# Patient Record
Sex: FEMALE | Race: WHITE | NOT HISPANIC OR LATINO | URBAN - METROPOLITAN AREA
[De-identification: names, ages, dates, MRNs, and addresses within clinical notes are randomized per-mention and may not be internally consistent; named-entity substitution may affect disease eponyms.]

---

## 2023-02-28 PROBLEM — M25.559 HIP JOINT PAIN: Status: ACTIVE | Noted: 2023-02-28

## 2023-02-28 PROBLEM — G57.91 NEURITIS OF RIGHT FOOT: Status: ACTIVE | Noted: 2023-02-28

## 2023-02-28 PROBLEM — S39.012A LUMBAR STRAIN, INITIAL ENCOUNTER: Status: ACTIVE | Noted: 2023-02-28

## 2023-02-28 PROBLEM — M54.50 LOW BACK PAIN: Status: ACTIVE | Noted: 2023-02-28

## 2023-02-28 PROBLEM — R56.9 SEIZURES (MULTI): Status: ACTIVE | Noted: 2023-02-28

## 2023-02-28 PROBLEM — M77.41 METATARSALGIA OF BOTH FEET: Status: ACTIVE | Noted: 2023-02-28

## 2023-02-28 PROBLEM — M25.519 SHOULDER PAIN: Status: ACTIVE | Noted: 2023-02-28

## 2023-02-28 PROBLEM — M77.42 METATARSALGIA OF BOTH FEET: Status: ACTIVE | Noted: 2023-02-28

## 2023-02-28 PROBLEM — G57.92 NEURITIS OF FOOT, LEFT: Status: ACTIVE | Noted: 2023-02-28

## 2023-02-28 PROBLEM — M79.671 PAIN IN BOTH FEET: Status: ACTIVE | Noted: 2023-02-28

## 2023-02-28 PROBLEM — M20.22 HALLUX RIGIDUS OF LEFT FOOT: Status: ACTIVE | Noted: 2023-02-28

## 2023-02-28 PROBLEM — M79.672 PAIN IN BOTH FEET: Status: ACTIVE | Noted: 2023-02-28

## 2023-02-28 RX ORDER — DEXTROAMPHETAMINE SACCHARATE, AMPHETAMINE ASPARTATE, DEXTROAMPHETAMINE SULFATE AND AMPHETAMINE SULFATE 3.75; 3.75; 3.75; 3.75 MG/1; MG/1; MG/1; MG/1
TABLET ORAL
COMMUNITY
Start: 2015-02-13

## 2023-02-28 RX ORDER — SERTRALINE HYDROCHLORIDE 100 MG/1
TABLET, FILM COATED ORAL
COMMUNITY
Start: 2015-02-13

## 2023-02-28 RX ORDER — ZOLPIDEM TARTRATE 10 MG/1
TABLET ORAL
COMMUNITY
Start: 2016-06-09

## 2023-02-28 RX ORDER — FOLIC ACID 1 MG/1
4 TABLET ORAL DAILY
COMMUNITY
Start: 2018-03-02

## 2023-02-28 RX ORDER — DEXTROAMPHETAMINE SACCHARATE, AMPHETAMINE ASPARTATE, DEXTROAMPHETAMINE SULFATE AND AMPHETAMINE SULFATE 1.25; 1.25; 1.25; 1.25 MG/1; MG/1; MG/1; MG/1
TABLET ORAL
COMMUNITY
Start: 2016-06-09

## 2023-02-28 RX ORDER — LEVETIRACETAM 1000 MG/1
1 TABLET ORAL 2 TIMES DAILY
COMMUNITY
Start: 2021-02-01

## 2023-03-07 ENCOUNTER — APPOINTMENT (OUTPATIENT)
Dept: PEDIATRICS | Facility: CLINIC | Age: 43
End: 2023-03-07
Payer: COMMERCIAL

## 2024-08-21 ENCOUNTER — APPOINTMENT (OUTPATIENT)
Dept: NEUROLOGY | Facility: CLINIC | Age: 44
End: 2024-08-21
Payer: COMMERCIAL

## 2024-08-21 VITALS
HEART RATE: 88 BPM | BODY MASS INDEX: 22.2 KG/M2 | HEIGHT: 64 IN | DIASTOLIC BLOOD PRESSURE: 75 MMHG | WEIGHT: 130 LBS | SYSTOLIC BLOOD PRESSURE: 117 MMHG

## 2024-08-21 DIAGNOSIS — R56.9 SEIZURES (MULTI): Primary | ICD-10-CM

## 2024-08-21 RX ORDER — LEVETIRACETAM 1000 MG/1
1000 TABLET ORAL 2 TIMES DAILY
Qty: 60 TABLET | Refills: 11 | Status: SHIPPED | OUTPATIENT
Start: 2024-08-21 | End: 2025-08-21

## 2024-08-21 ASSESSMENT — ENCOUNTER SYMPTOMS
LOSS OF SENSATION IN FEET: 0
OCCASIONAL FEELINGS OF UNSTEADINESS: 0
DEPRESSION: 1

## 2024-08-21 ASSESSMENT — PATIENT HEALTH QUESTIONNAIRE - PHQ9
1. LITTLE INTEREST OR PLEASURE IN DOING THINGS: SEVERAL DAYS
2. FEELING DOWN, DEPRESSED OR HOPELESS: SEVERAL DAYS
SUM OF ALL RESPONSES TO PHQ9 QUESTIONS 1 & 2: 2

## 2024-08-21 NOTE — PROGRESS NOTES
"Adult Epilepsy Clinic History and Physical    History Of Present Illness  Era Gray is a 44 y.o. woman with history of suspected seizures who presents to re-establish care in the epilepsy clinic after being seen last by Dr. Shaw 2/1/21.     Last Clinic Visit:    Discussed that her events are unusual given her events stopped for nearly two years after stopping taking medication at the start of her pregnancy only occurring under very stressful conditions. However, decision was made to continue Keppra 100 BID and follow up in 4-5 months as there is enough suspicion that these are epileptic events with condition of if she continues to have events while on the current dose of medication she will need to admit the patient to the EMU. Instructed not be driving for at least six months after her last seizure which was December 22/23rd, 2020.      Epilepsy Classification:  Epileptic Paroxysmal Episodes  Epileptogenic Zone: Unknown  Epileptic seizure semiology:  1. 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 Last in Jan 2024.             Onset: 2007 though possibly as early as 1999  Etiology:          Unknown                                           Significant Comorbidities: Depression  Epilepsy Risk factors: Head trauma in 2007 with amnesia with viral meningitis   Prior AEDs: Lamotrigine (non-adherence), Phenytoin (made her feel slow)  MRI: Report normal from 4/5/15  Current disease therapies: Keppra 1000 mg BID  Baseline levels/date: Keppra level 28 (on 3/2/18 at 16:21)       Seizure-Event History:   At the age of 19 she had \"fainting spells\" (she was a college student who just started taking Wellbutrin and had episodes where she passed out) and did not seek medical attention even though she had 2-3 episodes. There was no associated tongue bite. No witness were present.      Then at the age of 26 she suffered from viral meningitis as well " "has head trauma with amnesia during the meningitis. She started to feel \"difficult to explain\" like \"uncoordinated thinking and random thoughts\" and then she passes out with the next thing she remembers is waking up confused and generalized soreness which might last for an hour. For 2 weeks after each episode she has trouble remembering basic stuff like phone number, address, her age etc. No tongue bites or incontinence. Witnesses have described them as \"she tenses up with minimal shaking, foaming of mouth\". She was previously on Phenytoin and lamotrigine which she was non compliant.     2015: She had a five day EMU evaluation which showed no evidence of epilepsy although no events were captured, MRI done at that time was normal. She stopped taking AEDs by herself and then saw an general neurologist in April 2017. At that time keppra was started and then increased to 500 mg BID in August 2017. Since August 2017, pt reports that she is compliant with Keppra. She had about 4 seizures-like events similar to one described above. In addition she had 4-5 episodes where had the initial feeling (aura) that she is going to have a seizure but it did not progress.     2018 seen by Dr. Lee, on Keppra 750 mg BID.     2019: Stopped taking her Keppra when she had a pregnancy in July 2019. Gave birth in April 2020. No events 2018 to 2020.   2020 just had her \"Jumbled thinking\", had four episodes of this 5945-8801. On December 22 or 23rd she reports her full body tonic episode. She was restarted on Keppra 1000 mg BID.     She notes that she had increased frequency of her events that seen to happen when she has increased stress in her life.      Seizure description:  She describes that the seizures start with a feeling difficult to explain, resembling an incoherent thinking and inability to get her thoughts straight. This makes her afraid, it lasts only a few seconds. After that she does not remember anything about the seizure " "itself. She wakes up terrified and is confused. It takes her many hours to get back to baseline and this is taking longer with every seizures. She had only one episode of an aura without progressing into a generalized convulsion, and occasionally she would not have the aura with the seizures.  As per the witnesses, she would scream loudly and shake all over with mostly stiffening. There is no description of head version or other asymmetries in the shaking pattern except drooling and a \"facial droop\" as per the mother, although she cannot remember the exact side. The duration is unknown but in the order of minutes?     Previous medications:  She was started on Lamictal though she was taking it in inconsistently, replaced with Phenytoin in 2015, which she also took inconsistently. It was stopped and Keppra was started, then pt stopped taking Keppra when she became pregnant in 2019. Took 5023-6098. Stopped again after her 4th child was born in 2022 until 2024.      Medical history  - History of viral meningitis  - Depression and anxiety     Surgical history  - Tonsillectomy  - History of foot surgery     Social History  - Tobacco: denies use  - Alcohol: Has two glasses of wine a couple times a week  - Drugs: Denies use  - Works as a dance instructor     Family History  - Denies history of epilepsy     Allergies  NKDA     Meds  - Keppra 1000 mg BID  - Zoloft      Present Concerns:   Today she never restarted taking medications in after stopping in 2022 (preg with 4th child) however had a seizure-like event in Jan 2024. She was standing watching her daughter play on the floor, then felt a little tired and next thing she knows she woke up on the ground. She was told by daughter and  that she had fallen and was having full body convulsions for over 5 minutes. No tongue bite, incontinence. Took a day to feel back to normal. Started back on Keppra 1000 BID with no SE or seizure-like events since.     Epilepsy Risk " Factors:   History of head trauma: n  History of Febrile seizures: n  Family history of seizures/epilepsy: n  History of CNS infection: viral meningitis  History of other CNS injury: no  Birth/Developmental history: no    Epilepsy Co-morbidites:  Depression: y  Anxiety: y  Memory loss: n  Sleep disorders: n    History of status epilepticus: no    Social History:   ETOH: no  Smoking: no  Illicit drug use: no  Occupation: dance instructor  Education level: cosmotology  Driving: yes    Past Medical History  Past Medical History:   Diagnosis Date    Personal history of infections of the central nervous system 02/13/2015    History of meningitis     Surgical History  Past Surgical History:   Procedure Laterality Date    FOOT SURGERY  02/13/2015    Foot Surgery    TONSILLECTOMY  02/13/2015    Tonsillectomy     Social History     Allergies  Patient has no known allergies.  (Not in a hospital admission)    Medications  Current Outpatient Medications   Medication Instructions    amphetamine-dextroamphetamine (AdderalL) 15 mg tablet Adderall 15 MG Oral Tablet   Refills: 0        Start : 13-Feb-2015   Active    amphetamine-dextroamphetamine (Adderall) 5 mg tablet Amphetamine-Dextroamphetamine 5 MG Oral Tablet   Quantity: 30  Refills: 0        Start : 9-Jun-2016   Active    folic acid (Folvite) 1 mg tablet 4 tablets, oral, Daily    Lactobacillus acidophilus (ACIDOPHILUS ORAL) Acidophilus Oral Capsule   Refills: 0        Start : 13-Feb-2015   Active    levETIRAcetam (Keppra) 1,000 mg tablet 1 tablet, oral, 2 times daily    sertraline (Zoloft) 100 mg tablet Zoloft 100 MG Oral Tablet   Refills: 0        Start : 13-Feb-2015   Active    zolpidem (Ambien) 10 mg tablet Zolpidem Tartrate 10 MG Oral Tablet   Quantity: 30  Refills: 0        Start : 9-Jun-2016   Active           Review of Systems  Physical Exam  Constitutional: General appearance: no acute distress   Cranial nerves III, IV, and VI: Pupils round, equally reactive to light;  no ptosis. EOMs intact. No nystagmus.   Cranial Nerve V: Facial sensation intact bilaterally.   Cranial nerve VII: Normal and symmetric facial strength.   Cranial nerve VIII: Hearing is intact bilaterally to finger rub / whisper.   Cranial nerves IX and X: Palate elevates symmetrically.   Cranial nerve XI: Shoulder shrug and neck rotation strength are intact.   Cranial nerve XII: Tongue midline with normal strength.   Motor: Muscle bulk was normal in both upper and lower extremities. Muscle tone was normal in both upper and lower extremities. Normal strength in all limbs.   Deep Tendon Reflexes: left biceps 2, right biceps 2, left triceps 2, right triceps 2, left brachioradialis 2, right brachioradialis 2, left patella 2, right patella 2, left ankle jerk 2, right ankle jerk 2.   Sensory Exam: Normal to light touch.   Coordination: There is no limb dystaxia and rapid alternating movements are intact.   Gait: Gait is normal without spasticity, ataxia or bradykinesia. Stance is stable with a negative Romberg.       Last Recorded Vitals  There were no vitals taken for this visit.    Relevant Results/Neurodiagnostics  No MRI head results found for the past 12 months       I have personally reviewed the above imaging and EEG results.     Assessment/Plan  Era Gray is a 44 y.o. woman with history of suspected seizures who presents to re-establish care in the epilepsy clinic after being seen last by Dr. Shaw 2/1/21. Though EMU stay in was uneventful, with no events, she continues to have events concerning for GTC with non-compliance of prescribed Keppra.     Epilepsy Classification:  Epileptic Paroxysmal Episodes  Epileptogenic Zone: Unknown  Epileptic seizure semiology:  1. Type 1: Psychic aura (angel vu) -> GTC            Frequency: As often as every 1-2 months            Onset: 2007 though possibly as early as 1999  Etiology:          Unknown                                           Significant Comorbidities:  Depression  Epilepsy Risk factors: Head trauma in 2007 with amnesia with viral meningitis   Prior AEDs: Lamotrigine (non-adherence), Phenytoin (made her feel slow)  MRI: Report normal from 4/5/15  Current disease therapies: Keppra 1000 mg BID  Baseline levels/date: Keppra level 28 (on 3/2/18 at 16:21)    PLAN:  1) Continue Keppra 1000 BID  2) RTC 6 months.         Gene Henry   Epilepsy Center, [facility]    I saw and evaluated the patient. I personally obtained the key and critical portions of the history and physical exam or was physically present for key and critical portions performed by the resident/fellow. I reviewed the resident/fellow's documentation and discussed the patient with the resident/fellow. I agree with the resident/fellow's medical decision making as documented in the note.    Juana Vital MD

## 2024-08-21 NOTE — LETTER
August 21, 2024     Patient: Era Gray   YOB: 1980   Date of Visit: 8/21/2024       To Whom It May Concern:    Era Gray was seen in my clinic on 8/21/2024 at 9:30 am.     If you have any questions or concerns, please don't hesitate to call.         Sincerely,         Juana Vital MD        CC: No Recipients

## 2024-09-10 ENCOUNTER — APPOINTMENT (OUTPATIENT)
Dept: NEUROLOGY | Facility: CLINIC | Age: 44
End: 2024-09-10
Payer: COMMERCIAL

## 2024-11-05 ENCOUNTER — APPOINTMENT (OUTPATIENT)
Dept: NEUROLOGY | Facility: CLINIC | Age: 44
End: 2024-11-05
Payer: COMMERCIAL

## 2024-11-06 ENCOUNTER — EVALUATION (OUTPATIENT)
Dept: PHYSICAL THERAPY | Facility: CLINIC | Age: 44
End: 2024-11-06
Payer: COMMERCIAL

## 2024-11-06 DIAGNOSIS — S83.242A OTHER TEAR OF MEDIAL MENISCUS, CURRENT INJURY, LEFT KNEE, INITIAL ENCOUNTER: ICD-10-CM

## 2024-11-06 DIAGNOSIS — M25.562 LEFT MEDIAL KNEE PAIN: Primary | ICD-10-CM

## 2024-11-06 DIAGNOSIS — M17.12 UNILATERAL PRIMARY OSTEOARTHRITIS, LEFT KNEE: ICD-10-CM

## 2024-11-06 DIAGNOSIS — S83.105A UNSPECIFIED DISLOCATION OF LEFT KNEE, INITIAL ENCOUNTER: ICD-10-CM

## 2024-11-06 PROCEDURE — 97161 PT EVAL LOW COMPLEX 20 MIN: CPT | Mod: GP | Performed by: PHYSICAL THERAPIST

## 2024-11-06 PROCEDURE — 97110 THERAPEUTIC EXERCISES: CPT | Mod: GP | Performed by: PHYSICAL THERAPIST

## 2024-11-06 ASSESSMENT — ENCOUNTER SYMPTOMS
DEPRESSION: 0
OCCASIONAL FEELINGS OF UNSTEADINESS: 0
LOSS OF SENSATION IN FEET: 0

## 2024-11-06 ASSESSMENT — COLUMBIA-SUICIDE SEVERITY RATING SCALE - C-SSRS
1. IN THE PAST MONTH, HAVE YOU WISHED YOU WERE DEAD OR WISHED YOU COULD GO TO SLEEP AND NOT WAKE UP?: NO
6. HAVE YOU EVER DONE ANYTHING, STARTED TO DO ANYTHING, OR PREPARED TO DO ANYTHING TO END YOUR LIFE?: NO
2. HAVE YOU ACTUALLY HAD ANY THOUGHTS OF KILLING YOURSELF?: NO

## 2024-11-06 ASSESSMENT — PATIENT HEALTH QUESTIONNAIRE - PHQ9
2. FEELING DOWN, DEPRESSED OR HOPELESS: NOT AT ALL
1. LITTLE INTEREST OR PLEASURE IN DOING THINGS: NOT AT ALL
SUM OF ALL RESPONSES TO PHQ9 QUESTIONS 1 AND 2: 0

## 2024-11-06 NOTE — PROGRESS NOTES
PHYSICAL THERAPY   EVALUATION & TREATMENT NOTE    Patient Name:  Era Gray   Patient MRN: [id number]  Date: 11/6/2024    Time Calculation  Start Time: 0308  Stop Time: 0402  Time Calculation (min): 54 min    Insurance:  Insurance Type: [facility]  Visit number: 1  [place]ved # of visits [place]  Authorization Needed: No    General   Reason for visit: L knee medial meniscus tear   Referred by: Dawson Singer    Therapy diagnoses:   Problem List Items Addressed This Visit             ICD-10-CM    Left medial knee pain - Primary M25.562     Other Visit Diagnoses         Codes    Unspecified dislocation of left knee, initial encounter     S83.105A    Other tear of medial meniscus, current injury, left knee, initial encounter     S83.242A    Relevant Orders    Follow Up In Physical Therapy    Unilateral primary osteoarthritis, left knee     M17.12            ASSESSMENT   43 y/o female c/o L knee pain 2/2 medial meniscus tear for the past few years, worsening the last few months. 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 Patient will benefit from skilled therapy to address these impairments and return to prior level of functioning.     PLAN   Goals  1. Pt will be independent in HEP in 6-8 weeks  2. Pt will report 0-1/10 L knee pain at rest and with activity in 6-8 weeks.  3. 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 Pt will demonstrate good form with SL squat in 6-8 weeks.  5. Pt will report LEFS score > 70/80 in 6-8 weeks.    Plan of care to include: therapeutic exercise, therapeutic activity, soft tissue mobilization, joint mobilizations, neuromuscular re-education, pt education, self care activities, home program, vaso/cryotherapy, dry  needling    1x/week for 6-8 weeks.    Patient agrees to plan of care.    SUBJECTIVE   Reviewed medical history form with patient and medical screening assessed     Knee pain started during her last pregnancy with weight gain. Has been getting worse and has had good days and bad days. MCL sprain and medial meniscus. Had a course of oral steroids which helped.     Pain:  Medial L knee joint. Denies catching, but does hear clicking  Exacerbating factors include bending the knee fully, push off in lateral movements, turning out  Relieving factors include resting, ice, NSAIDS    Function:  Pushes through to do her normal ADLS, tries to favor it when teaching dance.  Sleep- unaffected by her knee  Lives with her  and 4 kids.     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     Social:  Exercise- none outside of her work    Pt goals: alleviate pain    Language: English  Potential barriers to treatment: continue to assess    Precautions:    PMH: seizures  Fall risk -  none      OBJECTIVE *=painful  Gait Ambulates IND, non antalgic    Observation/Posture  L pes planus and arch pronation  1st position plie with knees over 1st toe (dynamic valgus)  Squats with significant anterior drive. SL squat dec stability on L LE    Palpation  (+) TTP L medial joint line    Range of Motion (R, L in degrees)  Knee WNL B some pain at end range flexion    Flexibility (R, L)  Hamstrings WNL, WNL  Quads min dec, min dec    Strength (R, L MMT out of 5)  Hip Flexion 5, 5  Hip Extension 4, 4  Hip Abduction 4, 4  Hip Adduction 4, 4-  Knee Flexion 5, 4+*  Knee Extension 5, 5    Outcome Measures  LEFS = 65/80    Today's treatment and initial evaluation included:  - Patient education regarding diagnosis, prognosis, contributing factors, comorbidities, activity modification, symptom monitoring, importance of HEP, role of PT, postural re-education, appropriate shoe wear, work ergonomics, body mechanics, return to sport, reviewed  office cancel/no show policy.  - Review of POC   - Therapeutic Exercise & given HEP handout & blue band::  Access Code: [email or web address]  - Arch Lifting  - 10 x daily - 10 reps - 10 sec hold  - Single Leg Sit to Stand with Arms Extended  - 1 x daily - 2 sets - 10 reps  - Standing Hip Abduction with Resistance at Ankles  - 1 x daily - 2 sets - 15 reps  - Hip Extension with Resistance Loop  - 1 x daily - 2 sets - 15 reps  - Single Leg Bridge  - 1 x daily - 2 sets - 10 reps - 5 hold  - Modified Side Plank with Hip Abduction  - 1 x daily - 2 sets - 20 reps    PT Evaluation Time Entry  PT Evaluation (Low) Time Entry: 30  PT Therapeutic Procedures Time Entry  Therapeutic Exercise Time Entry: 24

## 2024-11-12 ENCOUNTER — TREATMENT (OUTPATIENT)
Dept: PHYSICAL THERAPY | Facility: CLINIC | Age: 44
End: 2024-11-12
Payer: COMMERCIAL

## 2024-11-12 DIAGNOSIS — S83.242A OTHER TEAR OF MEDIAL MENISCUS, CURRENT INJURY, LEFT KNEE, INITIAL ENCOUNTER: ICD-10-CM

## 2024-11-12 DIAGNOSIS — M25.562 LEFT MEDIAL KNEE PAIN: Primary | ICD-10-CM

## 2024-11-12 PROCEDURE — 97140 MANUAL THERAPY 1/> REGIONS: CPT | Mod: GP | Performed by: PHYSICAL THERAPIST

## 2024-11-12 NOTE — PROGRESS NOTES
PHYSICAL THERAPY   TREATMENT NOTE    Patient Name:  Era Gray   Patient MRN: [id number]  Date: 11/12/2024    Time Calculation  Start Time: 1030  Stop Time: 1102  Time Calculation (min): 32 min    Insurance:  Insurance Type: [facility]  Visit number: 2    Approved # of visits [place]  Authorization Needed: No    General   Reason for visit: L knee medial meniscus tear   Referred by: Dawson Singer    Therapy diagnoses:   Problem List Items Addressed This Visit             ICD-10-CM    Left medial knee pain - Primary M25.562     Other Visit Diagnoses         Codes    Other tear of medial meniscus, current injury, left knee, initial encounter     S83.242A            Assessment:  Pt tolerates session without indication of adverse reaction to IDN today. Improved squat depth without clicking and some decreased pain.    Plan: Assess response to needling. Continue to update HEP as needed and manual prn.      Subjective  No significant changes since last visit.  - Pain (0-10): 0/10 knee pain today but back and neck are hurting.    Precautions  PMH: seizures  Fall Risk: none    Objective  HEP Access Code: [email or web address] (arch lifts, SL sit to stand, hip abd blue, hip ext blue, SL bridge, side plank with hip abd)  Treatment Performed:   Manual Therapy:   STM/DTM L adductor, quad    50mm x 3 L proximal post tib  30mm x 2 L medial knee / distal adductor  50mm x 1 L distal adductor    Pt was educated on risk, benefits, and expectations of dry needling techniques and agrees to today's intervention using clean needle technique.          PT Therapeutic Procedures Time Entry  Manual Therapy Time Entry: 32

## 2024-11-20 ENCOUNTER — TREATMENT (OUTPATIENT)
Dept: PHYSICAL THERAPY | Facility: CLINIC | Age: 44
End: 2024-11-20
Payer: COMMERCIAL

## 2024-11-20 DIAGNOSIS — S83.242A OTHER TEAR OF MEDIAL MENISCUS, CURRENT INJURY, LEFT KNEE, INITIAL ENCOUNTER: ICD-10-CM

## 2024-11-20 DIAGNOSIS — M25.562 LEFT MEDIAL KNEE PAIN: Primary | ICD-10-CM

## 2024-11-20 PROCEDURE — 97140 MANUAL THERAPY 1/> REGIONS: CPT | Mod: GP | Performed by: PHYSICAL THERAPIST

## 2024-11-20 NOTE — PROGRESS NOTES
PHYSICAL THERAPY   TREATMENT NOTE    Patient Name:  Era Gray   Patient MRN: [id number]  Date: 11/20/2024    Time Calculation  Start Time: 0231  Stop Time: 0300  Time Calculation (min): 29 min    Insurance:  Insurance Type: [facility]  Visit number: 3    Approved # of visits [place]  Authorization Needed: No    General   Reason for visit: L knee medial meniscus tear   Referred by: Dawson Singer    Therapy diagnoses:   Problem List Items Addressed This Visit             ICD-10-CM    Left medial knee pain - Primary M25.562     Other Visit Diagnoses         Codes    Other tear of medial meniscus, current injury, left knee, initial encounter     S83.242A              Assessment:  Pt tolerates session without indication of adverse reaction to IDN today. Improved comfort in sitting in ER after session.    Plan: Continue to update HEP as needed and manual prn.      Subjective  Feels like the needling helped last week to the point she was able to sit in positions that normally hurt with minimal pain, but then she was sick for a few days and missed her HEP, and it's gotten a little worse since then.  - Pain (0-10): minimal knee pain    Precautions  PMH: seizures  Fall Risk: none    Objective  HEP Access Code: [email or web address] (arch lifts, SL sit to stand, hip abd blue, hip ext blue, SL bridge, side plank with hip abd)  Treatment Performed:   Therapeutic Exercise:  - attitude front leg lifts and back leg lifts x 10 L  - self massage to medial knee joint line    Manual Therapy:   STM/DTM L adductor, quad    50mm x 1 L proximal post tib  50mm x 1 L adductor  30mm x 2 L medial knee / distal adductor  15mm x 2 L medial patellar tendon    Pt was educated on risk, benefits, and expectations of dry needling techniques and agrees to today's intervention using clean needle technique.          PT Therapeutic Procedures Time Entry  Manual Therapy Time Entry: 24  Therapeutic Exercise Time Entry: 5

## 2024-12-03 ENCOUNTER — DOCUMENTATION (OUTPATIENT)
Dept: PHYSICAL THERAPY | Facility: CLINIC | Age: 44
End: 2024-12-03
Payer: COMMERCIAL

## 2024-12-03 ENCOUNTER — APPOINTMENT (OUTPATIENT)
Dept: PHYSICAL THERAPY | Facility: CLINIC | Age: 44
End: 2024-12-03
Payer: COMMERCIAL

## 2024-12-03 NOTE — PROGRESS NOTES
Physical Therapy                 Therapy Communication Note    Patient Name: Era Gray  MRN: [id number]  Department:   Room: Room/bed info not found  Today's Date: 12/3/2024     Discipline: Physical Therapy    Missed Time: Cancel    Comment: Sick

## 2024-12-10 ENCOUNTER — TREATMENT (OUTPATIENT)
Dept: PHYSICAL THERAPY | Facility: CLINIC | Age: 44
End: 2024-12-10
Payer: COMMERCIAL

## 2024-12-10 DIAGNOSIS — S83.242A OTHER TEAR OF MEDIAL MENISCUS, CURRENT INJURY, LEFT KNEE, INITIAL ENCOUNTER: ICD-10-CM

## 2024-12-10 DIAGNOSIS — M25.562 LEFT MEDIAL KNEE PAIN: Primary | ICD-10-CM

## 2024-12-10 PROCEDURE — 97140 MANUAL THERAPY 1/> REGIONS: CPT | Mod: GP | Performed by: PHYSICAL THERAPIST

## 2024-12-10 NOTE — PROGRESS NOTES
PHYSICAL THERAPY   TREATMENT NOTE    Patient Name:  Era Gray   Patient MRN: [id number]  Date: 12/10/2024    Time Calculation  Start Time: 0948  Stop Time: 1021  Time Calculation (min): 33 min    Insurance:  Insurance Type: [facility]  Visit number: 4    Approved # of visits [place]  Authorization Needed: No    General   Reason for visit: L knee medial meniscus tear   Referred by: Dawson Singer    Therapy diagnoses:   Problem List Items Addressed This Visit             ICD-10-CM    Left medial knee pain - Primary M25.562     Other Visit Diagnoses         Codes    Other tear of medial meniscus, current injury, left knee, initial encounter     S83.242A            Assessment:  Pt tolerates session without indication of adverse reaction to IDN today, although she does seem to have more tightness than last time in needling points.     Plan: Continue to update HEP as needed and manual prn.      Subjective  Has been sick so she didn't do as much this past week. Last time the needling didn't seem to help much.   - Pain (0-10): minimal knee pain    Precautions  PMH: seizures  Fall Risk: none    Objective  HEP Access Code: [email or web address] (arch lifts, SL sit to stand, hip abd blue, hip ext blue, SL bridge, side plank with hip abd)  Treatment Performed:   Therapeutic Exercise:  - review HEP & rotation from B hips in dance to avoid stress on knees    Manual Therapy:   STM/DTM L adductor, quad    50mm x 1 each L proximal post tib, distal adductor, medial hamstring  30mm x 2 L medial knee   30mm x 1 L proximal post tib    Pt was educated on risk, benefits, and expectations of dry needling techniques and agrees to today's intervention using clean needle technique.          PT Therapeutic Procedures Time Entry  Manual Therapy Time Entry: 30  Therapeutic Exercise Time Entry: 3

## 2024-12-17 ENCOUNTER — TREATMENT (OUTPATIENT)
Dept: PHYSICAL THERAPY | Facility: CLINIC | Age: 44
End: 2024-12-17
Payer: COMMERCIAL

## 2024-12-17 DIAGNOSIS — S83.242A OTHER TEAR OF MEDIAL MENISCUS, CURRENT INJURY, LEFT KNEE, INITIAL ENCOUNTER: ICD-10-CM

## 2024-12-17 DIAGNOSIS — M25.562 LEFT MEDIAL KNEE PAIN: Primary | ICD-10-CM

## 2024-12-17 PROCEDURE — 97110 THERAPEUTIC EXERCISES: CPT | Mod: GP | Performed by: PHYSICAL THERAPIST

## 2024-12-17 PROCEDURE — 97140 MANUAL THERAPY 1/> REGIONS: CPT | Mod: GP | Performed by: PHYSICAL THERAPIST

## 2024-12-17 NOTE — PROGRESS NOTES
"PHYSICAL THERAPY   TREATMENT NOTE    Patient Name:  Era Gray   Patient MRN: [id number]  Date: 12/17/2024    Time Calculation  Start Time: 0955  Stop Time: 1034  Time Calculation (min): 39 min    Insurance:  Insurance Type: [facility]  Visit number: 5    Approved # of visits [place]  Authorization Needed: No    General   Reason for visit: L knee medial meniscus tear   Referred by: Dawson Singer    Therapy diagnoses:   Problem List Items Addressed This Visit             ICD-10-CM    Left medial knee pain - Primary M25.562     Other Visit Diagnoses         Codes    Other tear of medial meniscus, current injury, left knee, initial encounter     S83.242A          Assessment:  Pt tolerates session without indication of adverse reaction to IDN today, and does have improved comfort in seated hip rotated positioning.     Plan: Continue to update HEP as needed and manual prn.      Subjective  Last time felt better for a few days after session. Has been better this week about her HEP.   - Pain (0-10): minimal knee pain    Precautions  PMH: seizures  Fall Risk: none    Objective  HEP Access Code: [email or web address] (arch lifts, SL sit to stand, hip abd blue, hip ext blue, SL bridge, side plank with hip abd, seated attitude lift, standing hip ER on supporting leg, B hip flex iso)  Treatment Performed:   Therapeutic Exercise:  - B hip flexion isometric x 10\" x 10  - standing hip ER with coupe turn out x 10 R/L    Manual Therapy:   STM/DTM L adductor, quad  Medial joint release L knee    50mm x 1 each L proximal post tib, distal adductor, medial hamstring  30mm x 2 L medial knee   30mm x 1 L proximal post tib  30mm x 1 L medial gastroc    Pt was educated on risk, benefits, and expectations of dry needling techniques and agrees to today's intervention using clean needle technique.          PT Therapeutic Procedures Time Entry  Manual Therapy Time Entry: 30  Therapeutic Exercise Time Entry: 9                  "

## 2024-12-23 ENCOUNTER — APPOINTMENT (OUTPATIENT)
Dept: PHYSICAL THERAPY | Facility: CLINIC | Age: 44
End: 2024-12-23
Payer: COMMERCIAL

## 2024-12-31 ENCOUNTER — TREATMENT (OUTPATIENT)
Dept: PHYSICAL THERAPY | Facility: CLINIC | Age: 44
End: 2024-12-31
Payer: COMMERCIAL

## 2024-12-31 DIAGNOSIS — S83.242A OTHER TEAR OF MEDIAL MENISCUS, CURRENT INJURY, LEFT KNEE, INITIAL ENCOUNTER: ICD-10-CM

## 2024-12-31 DIAGNOSIS — M25.562 LEFT MEDIAL KNEE PAIN: Primary | ICD-10-CM

## 2024-12-31 PROCEDURE — 97140 MANUAL THERAPY 1/> REGIONS: CPT | Mod: GP | Performed by: PHYSICAL THERAPIST

## 2024-12-31 NOTE — PROGRESS NOTES
PHYSICAL THERAPY   TREATMENT NOTE    Patient Name:  Era Gray   Patient MRN: [id number]  Date: 12/31/2024    Time Calculation  Start Time: 0948  Stop Time: 1018  Time Calculation (min): 30 min    Insurance:  Insurance Type: [facility]  Visit number: 6    [place] of visits [place]  Authorization Needed: No    General   Reason for visit: L knee medial meniscus tear   Referred by: Dawson Signer    Therapy diagnoses:   Problem List Items Addressed This Visit             ICD-10-CM    Left medial knee pain - Primary M25.562     Other Visit Diagnoses         Codes    Other tear of medial meniscus, current injury, left knee, initial encounter     S83.242A            Assessment:  Pt tolerates session without indication of adverse reaction to IDN today. Rest has seemed to help her progress more quickly towards goals.    Plan: Continue to update HEP as needed and manual prn.      Subjective  Was rough around [place] but she thinks it was all the sugar she was eating. 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  - Pain (0-10): 0/10    Precautions  PMH: seizures  Fall Risk: none    Objective  HEP Access Code: [email or web address] (arch lifts, SL sit to stand, hip abd blue, hip ext blue, SL bridge, side plank with hip abd, seated attitude lift, standing hip ER on supporting leg, B hip flex iso)  Treatment Performed:   Manual Therapy:   STM/DTM L adductor, quad  Medial joint release L knee    50mm x 1 each L proximal post tib, distal adductor, medial hamstring  30mm x 3 L medial knee   30mm x 1 L proximal post tib  30mm x 1 L medial gastroc    Pt was educated on risk, benefits, and expectations of dry needling techniques and agrees to today's intervention using clean needle technique.          PT Therapeutic Procedures Time Entry  Manual Therapy Time Entry: 30

## 2025-01-07 ENCOUNTER — TREATMENT (OUTPATIENT)
Dept: PHYSICAL THERAPY | Facility: CLINIC | Age: 45
End: 2025-01-07
Payer: COMMERCIAL

## 2025-01-07 DIAGNOSIS — S83.242A OTHER TEAR OF MEDIAL MENISCUS, CURRENT INJURY, LEFT KNEE, INITIAL ENCOUNTER: ICD-10-CM

## 2025-01-07 DIAGNOSIS — M25.562 LEFT MEDIAL KNEE PAIN: Primary | ICD-10-CM

## 2025-01-07 PROCEDURE — 97140 MANUAL THERAPY 1/> REGIONS: CPT | Mod: GP | Performed by: PHYSICAL THERAPIST

## 2025-01-07 PROCEDURE — 20560 NDL INSJ W/O NJX 1 OR 2 MUSC: CPT | Mod: GP | Performed by: PHYSICAL THERAPIST

## 2025-01-07 NOTE — PROGRESS NOTES
PHYSICAL THERAPY   TREATMENT NOTE    Patient Name:  Era Gray   Patient MRN: [id number]  Date: 2025    Time Calculation  Start Time: 0955  Stop Time: 1030  Time Calculation (min): 35 min    Insurance:  [facility]  Visit number: 7  (1 this year)  [place]ved [place] of visits [place]  Authorization Needed: No    General   Reason for visit: L knee medial meniscus tear   Referred by: Dawson Singer    Therapy diagnoses:   Problem List Items Addressed This Visit             ICD-10-CM    Left medial knee pain - Primary M25.562     Other Visit Diagnoses         Codes    Other tear of medial meniscus, current injury, left knee, initial encounter     S83.242A              Assessment:  Pt tolerates session without indication of adverse reaction to IDN today. 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  - Pain (0-10): 0/10    Precautions  PMH: seizures  Fall Risk: none    Objective  HEP Access Code: [email or web address] (arch lifts, SL sit to stand, hip abd blue, hip ext blue, SL bridge, side plank with hip abd, seated attitude lift, standing hip ER on supporting leg, B hip flex iso)  Treatment Performed:   Manual Therapy:   STM/DTM L adductor, quad, proximal gastroc  Medial joint release L knee    Dry Needlinmm x 1 each L proximal post tib, distal adductor, medial hamstring  30mm x 3 L medial patellar tendon    Pt was educated on risk, benefits, and expectations of dry needling techniques and agrees to today's intervention using clean needle technique.          PT Therapeutic Procedures Time Entry  Manual Therapy Time Entry: 23  Needle Insertion w/o Injection 1 or 2: 12

## 2025-01-14 ENCOUNTER — TREATMENT (OUTPATIENT)
Dept: PHYSICAL THERAPY | Facility: CLINIC | Age: 45
End: 2025-01-14
Payer: COMMERCIAL

## 2025-01-14 DIAGNOSIS — S83.242A OTHER TEAR OF MEDIAL MENISCUS, CURRENT INJURY, LEFT KNEE, INITIAL ENCOUNTER: ICD-10-CM

## 2025-01-14 DIAGNOSIS — M25.562 LEFT MEDIAL KNEE PAIN: Primary | ICD-10-CM

## 2025-01-14 PROCEDURE — 20560 NDL INSJ W/O NJX 1 OR 2 MUSC: CPT | Mod: GP | Performed by: PHYSICAL THERAPIST

## 2025-01-14 PROCEDURE — 97140 MANUAL THERAPY 1/> REGIONS: CPT | Mod: GP | Performed by: PHYSICAL THERAPIST

## 2025-01-14 NOTE — PROGRESS NOTES
PHYSICAL THERAPY   TREATMENT NOTE/RECHECK    Patient Name:  Era Gray   Patient MRN: [id number]  Date: 1/14/2025    Time Calculation  Start Time: 0949  Stop Time: 1020  Time Calculation (min): 31 min    Insurance:  Insurance Type: [employer]  Visit number: 8  (2 this year)  [place] of visits [place]  Authorization Needed: No    General   Reason for visit: L knee medial meniscus tear   Referred by: Dawson Singer    Therapy diagnoses:   Problem List Items Addressed This Visit             ICD-10-CM    Left medial knee pain - Primary M25.562     Other Visit Diagnoses         Codes    Other tear of medial meniscus, current injury, left knee, initial encounter     S83.242A              Assessment:  Pt progressing nicely with improved pain and function of her knee, however she does continue to note some 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 She also continues to demonstrate some L hip weakness, and is noting improvement with needling. She will  continue to benefit from skilled PT to address these deficits and return to full function.    Plan: Continue to update HEP as needed and manual prn.  Goals  1. Pt will be independent in HEP in 6-8 weeks - MET  2. Pt will report 0-1/10 L knee pain at rest and with activity in 6-8 weeks. - NEAR MET  3. Pt will demonstrate 5/5 B LE strength to return to full dance participation in 6-8 weeks - PROGRESSING  4. Pt will demonstrate good form with SL squat in 6-8 weeks. - MET  5. Pt will report LEFS score > 70/80 in 6-8 weeks. - MET      Subjective  Did feel better after last visit - was able to sit in bed cross legged without noticing. 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 Some pain with bending the knee fully but better than it was. Doesn't bother her to push off the leg. Turn out still hurts, is taking it easy with how much she's turning out. HEP is going well.   - Pain (0-10): 0/10 current. 2/10 at worst.     Precautions  PMH:  seizures  Fall Risk: none    Objective IE // TODAY  HEP Access Code: [email or web address] (arch lifts, SL sit to stand, hip abd blue, hip ext blue, SL bridge, side plank with hip abd, seated attitude lift, standing hip ER on supporting leg, B hip flex iso)     Observation/Posture   Squats with significant anterior drive. SL squat dec stability on L LE // alignment good and stability equal on both sides      Range of Motion (R, L in degrees)  Knee WNL B some pain at end range flexion // WNL no pain     Flexibility (R, L)  Quads min dec, min dec // WNL, WNL     Strength (R, L MMT out of 5)  Hip Flexion 5, 5   // 5, 5  Hip Extension 4, 4 // 4+, 4+  Hip Abduction 4, 4 // 5, 4+  Hip Adduction 4, 4- //4+, 4  Knee Flexion 5, 4+* // 5, 5  Knee Extension 5, 5 // 5, 5     Outcome Measures  LEFS = 65/80 // 77/80    Treatment Performed:   Therapeutic Exercise  - recheck    Manual Therapy:   STM/DTM L adductor, quad, proximal gastroc  Medial joint release L knee    Dry Needlinmm x 5 each L proximal post tib, distal adductor, medial hamstring  30mm x 3 L medial patellar tendon    Pt was educated on risk, benefits, and expectations of dry needling techniques and agrees to today's intervention using clean needle technique.          PT Therapeutic Procedures Time Entry  Manual Therapy Time Entry: 10  Needle Insertion w/o Injection 1 or 2: 21

## 2025-01-31 ENCOUNTER — TREATMENT (OUTPATIENT)
Dept: PHYSICAL THERAPY | Facility: CLINIC | Age: 45
End: 2025-01-31
Payer: COMMERCIAL

## 2025-01-31 DIAGNOSIS — M25.562 LEFT MEDIAL KNEE PAIN: Primary | ICD-10-CM

## 2025-01-31 DIAGNOSIS — S83.242A OTHER TEAR OF MEDIAL MENISCUS, CURRENT INJURY, LEFT KNEE, INITIAL ENCOUNTER: ICD-10-CM

## 2025-01-31 PROCEDURE — 20560 NDL INSJ W/O NJX 1 OR 2 MUSC: CPT | Mod: GP | Performed by: PHYSICAL THERAPIST

## 2025-01-31 PROCEDURE — 97140 MANUAL THERAPY 1/> REGIONS: CPT | Mod: GP | Performed by: PHYSICAL THERAPIST

## 2025-01-31 NOTE — PROGRESS NOTES
PHYSICAL THERAPY   TREATMENT NOTE    Patient Name:  Era Gray   Patient MRN: [id number]  Date: 2025    Time Calculation  Start Time: 1015  Stop Time: 1050  Time Calculation (min): 35 min    Insurance:  Insurance Type: [employer]  Visit number: 9  (includes 6 from last year)  [place] of visits [place]  Authorization Needed: No    General   Reason for visit: L knee medial meniscus tear   Referred by: Dawson Singer    Therapy diagnoses:   Problem List Items Addressed This Visit             ICD-10-CM    Left medial knee pain - Primary M25.562     Other Visit Diagnoses         Codes    Other tear of medial meniscus, current injury, left knee, initial encounter     S83.242A              Assessment:  Pt appears to be progressing nicely towards her goals at this point. No adverse reactions to needling today.    Plan: Continue to update HEP as needed and manual prn.      Subjective  Hasn't been doing her HEP as much lately but does feel like things are better overall.   - Pain (0-10): 0/10 current. 2/10 at worst.     Precautions  PMH: seizures  Fall Risk: none    Objective   HEP Access Code: [email or web address] (arch lifts, SL sit to stand, hip abd blue, hip ext blue, SL bridge, side plank with hip abd, seated attitude lift, standing hip ER on supporting leg, B hip flex iso)    Treatment Performed:   Manual Therapy:   STM/DTM L adductor, quad, proximal gastroc  Medial joint release L knee    Dry Needlinmm x 5 each L proximal post tib, distal adductor, medial hamstring  30mm x 4 L medial patellar tendon    Pt was educated on risk, benefits, and expectations of dry needling techniques and agrees to today's intervention using clean needle technique.          PT Therapeutic Procedures Time Entry  Manual Therapy Time Entry: 15  Needle Insertion w/o Injection 1 or 2: 10

## 2025-02-11 ENCOUNTER — DOCUMENTATION (OUTPATIENT)
Dept: PHYSICAL THERAPY | Facility: CLINIC | Age: 45
End: 2025-02-11
Payer: COMMERCIAL

## 2025-02-11 NOTE — PROGRESS NOTES
Physical Therapy                 Therapy Communication Note    Patient Name: Era Gray  MRN: [id number]  Department:   Room: Room/bed info not found  Today's Date: 2/11/2025     Discipline: Physical Therapy      Missed Time: No Show    Comment:

## 2025-02-25 ENCOUNTER — TREATMENT (OUTPATIENT)
Dept: PHYSICAL THERAPY | Facility: CLINIC | Age: 45
End: 2025-02-25
Payer: COMMERCIAL

## 2025-02-25 DIAGNOSIS — M25.562 LEFT MEDIAL KNEE PAIN: Primary | ICD-10-CM

## 2025-02-25 DIAGNOSIS — S83.242A OTHER TEAR OF MEDIAL MENISCUS, CURRENT INJURY, LEFT KNEE, INITIAL ENCOUNTER: ICD-10-CM

## 2025-02-25 PROCEDURE — 97140 MANUAL THERAPY 1/> REGIONS: CPT | Mod: GP | Performed by: PHYSICAL THERAPIST

## 2025-02-25 NOTE — PROGRESS NOTES
PHYSICAL THERAPY   TREATMENT NOTE    Patient Name:  Era Gray   Patient MRN: [id number]  Date: 2025    Time Calculation  Start Time: 1032  Stop Time: 1100  Time Calculation (min): 28 min    Insurance:  [facility]  Visit number: 10  (includes 6 from last year)  [place]ved [place] of visits [place]  Authorization Needed: No    General   Reason for visit: L knee medial meniscus tear   Referred by: Dawson Singer    Therapy diagnoses:   Problem List Items Addressed This Visit             ICD-10-CM    Left medial knee pain - Primary M25.562     Other Visit Diagnoses         Codes    Other tear of medial meniscus, current injury, left knee, initial encounter     S83.242A            Assessment:  Pt appears to be progressing nicely towards her goals at this point. No adverse reactions to needling today.    Plan: Continue to update HEP as needed and manual prn.      Subjective  Has been a little worse recently, maybe because of the weather, maybe because she got MRSA in her hip. Is more achey than usual, not as much catching. Was pretty sore after last session and noticed a little bit of catching  - Pain (0-10): 0/10 current. 2/10 at worst.     Precautions  PMH: seizures  Fall Risk: none    Objective   HEP Access Code: [email or web address] (arch lifts, SL sit to stand, hip abd blue, hip ext blue, SL bridge, side plank with hip abd, seated attitude lift, standing hip ER on supporting leg, B hip flex iso)    Treatment Performed:   Manual Therapy:   STM/DTM L adductor, quad, proximal gastroc  Medial joint release L knee    Dry Needlinmm x 5 each L proximal post tib, distal adductor, medial hamstring  30mm x 4 L medial & superior patellar tendon    Pt was educated on risk, benefits, and expectations of dry needling techniques and agrees to today's intervention using clean needle technique.          PT Therapeutic Procedures Time Entry  Manual Therapy Time Entry: 28

## 2025-03-11 ENCOUNTER — TREATMENT (OUTPATIENT)
Dept: PHYSICAL THERAPY | Facility: CLINIC | Age: 45
End: 2025-03-11
Payer: COMMERCIAL

## 2025-03-11 DIAGNOSIS — M25.562 LEFT MEDIAL KNEE PAIN: Primary | ICD-10-CM

## 2025-03-11 DIAGNOSIS — S83.242A OTHER TEAR OF MEDIAL MENISCUS, CURRENT INJURY, LEFT KNEE, INITIAL ENCOUNTER: ICD-10-CM

## 2025-03-11 PROCEDURE — 97140 MANUAL THERAPY 1/> REGIONS: CPT | Mod: GP | Performed by: PHYSICAL THERAPIST

## 2025-03-11 PROCEDURE — 20560 NDL INSJ W/O NJX 1 OR 2 MUSC: CPT | Mod: GP | Performed by: PHYSICAL THERAPIST

## 2025-03-11 NOTE — PROGRESS NOTES
PHYSICAL THERAPY   TREATMENT NOTE/RECHECK    Patient Name:  Era Gray   Patient MRN: [id number]  Date: 3/11/2025    Time Calculation  Start Time: 1045  Stop Time: 1122  Time Calculation (min): 37 min    Insurance:  [facility]  Visit number: 11  (includes 6 from last year)  [place]ved [place] of visits [place]  Authorization Needed: No    General   Reason for visit: L knee medial meniscus tear   Referred by: Dawson Singer    Therapy diagnoses:   Problem List Items Addressed This Visit             ICD-10-CM    Left medial knee pain - Primary M25.562     Other Visit Diagnoses         Codes    Other tear of medial meniscus, current injury, left knee, initial encounter     S83.242A            Assessment:  Pt appears to be progressing nicely towards her goals at this point. No adverse reactions to needling today.    Plan: Continue to update HEP as needed and manual prn.  Goals  1. Pt will be independent in HEP in 6-8 weeks - PARTIALLY MET  2. Pt will report 0-1/10 L knee pain at rest and with activity in 6-8 weeks. - PROGRESSING  3. 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 Pt will demonstrate good form with SL squat in 6-8 weeks.  5. Pt will report LEFS score > 70/80 in 6-8 weeks.      Subjective  Knee pain continues to vary day to day. Has not had any clicking or catching. Can sit in cross legged positions without issues. Some pain with bending the knee fully, especially in turn out. Jumping has been okay, no pain. More of a persistent annoyance and sensitive to touch. Has been doing her HEP a few times a week.  - Pain (0-10): 0/10 current. 2/10 at worst.     Precautions  PMH: seizures  Fall Risk: none    Objective  IE // TODAY  HEP Access Code: [email or web address] (arch lifts, SL sit to stand, hip abd blue, hip ext blue, SL bridge, side plank with hip abd, seated attitude lift, standing hip ER on supporting leg, B hip flex iso)  Observation/Posture  1st position plie with  knees over 1st toe (dynamic valgus) // good alignment noted   Squats with significant anterior drive. SL squat dec stability on L LE // good alignment and improved stability on L LE     Range of Motion (R, L in degrees)  Knee WNL B some pain at end range flexion // WNL B slight pain at very end range of flexion     Flexibility (R, L)  Quads min dec, min dec // WNL, WNL     Strength (R, L MMT out of 5)  Hip Flexion 5, 5 // 5, 5  Hip Extension 4, 4 // 4, 4+  Hip Abduction 4, 4 // 4+, 4+  Hip Adduction 4, 4- // 4+, 4-  Knee Flexion 5, 4+* // 5, 5*  Knee Extension 5, 5  // 5, 5     Outcome Measures  LEFS = 65/80 // 69/80    Treatment Performed:   Manual Therapy:   STM/DTM L adductor, quad, proximal gastroc, patellar tendon  Medial joint release L knee    Dry Needlinmm x 5 each L proximal post tib, distal adductor, medial hamstring  30mm x 4 L medial & superior patellar tendon    Pt was educated on risk, benefits, and expectations of dry needling techniques and agrees to today's intervention using clean needle technique.          PT Therapeutic Procedures Time Entry  Manual Therapy Time Entry: 12  Needle Insertion w/o Injection 1 or 2: 25

## 2025-03-31 ENCOUNTER — TREATMENT (OUTPATIENT)
Dept: PHYSICAL THERAPY | Facility: CLINIC | Age: 45
End: 2025-03-31
Payer: COMMERCIAL

## 2025-03-31 DIAGNOSIS — M25.562 LEFT MEDIAL KNEE PAIN: Primary | ICD-10-CM

## 2025-03-31 DIAGNOSIS — S83.242A OTHER TEAR OF MEDIAL MENISCUS, CURRENT INJURY, LEFT KNEE, INITIAL ENCOUNTER: ICD-10-CM

## 2025-03-31 PROCEDURE — 20560 NDL INSJ W/O NJX 1 OR 2 MUSC: CPT | Mod: GP | Performed by: PHYSICAL THERAPIST

## 2025-03-31 PROCEDURE — 97140 MANUAL THERAPY 1/> REGIONS: CPT | Mod: GP | Performed by: PHYSICAL THERAPIST

## 2025-03-31 NOTE — PROGRESS NOTES
PHYSICAL THERAPY   TREATMENT NOTE    Patient Name:  Era Gray   Patient MRN: [id number]  Date: 3/31/2025    Time Calculation  Start Time: 1228  Stop Time: 1257  Time Calculation (min): 29 min    Insurance:  Insurance Type: [employer]  Visit number: 12  (includes 6 from last year)  [place] visits [place]  Authorization Needed: No    General   Reason for visit: L knee medial meniscus tear   Referred by: Dawson Singer    Therapy diagnoses:   Problem List Items Addressed This Visit             ICD-10-CM    Left medial knee pain - Primary M25.562     Other Visit Diagnoses         Codes    Other tear of medial meniscus, current injury, left knee, initial encounter     S83.242A              Assessment:  Pt appears to be progressing nicely towards her goals at this point. No adverse reactions to needling today. 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    Plan: Continue to update HEP as needed and manual prn.      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 HEP feeling good. Back has been hurting more lately, thinks it's from all the standing at work.  - Pain (0-10): 0/10     Precautions  PMH: seizures  Fall Risk: none    Objective      Treatment Performed:   Manual Therapy:   STM/DTM L adductor, quad, proximal gastroc, patellar tendon  Medial joint release L knee    Dry Needlinmm x 5 each L proximal post tib, distal adductor, medial hamstring  30mm x 4 L medial & superior patellar tendon    Pt was educated on risk, benefits, and expectations of dry needling techniques and agrees to today's intervention using clean needle technique.          PT Therapeutic Procedures Time Entry  Manual Therapy Time Entry: 19  Needle Insertion w/o Injection 1 or 2: 10

## 2025-04-08 ENCOUNTER — TREATMENT (OUTPATIENT)
Dept: PHYSICAL THERAPY | Facility: CLINIC | Age: 45
End: 2025-04-08
Payer: COMMERCIAL

## 2025-04-08 DIAGNOSIS — S83.242A OTHER TEAR OF MEDIAL MENISCUS, CURRENT INJURY, LEFT KNEE, INITIAL ENCOUNTER: ICD-10-CM

## 2025-04-08 DIAGNOSIS — M25.562 LEFT MEDIAL KNEE PAIN: Primary | ICD-10-CM

## 2025-04-08 PROCEDURE — 20560 NDL INSJ W/O NJX 1 OR 2 MUSC: CPT | Mod: GP | Performed by: PHYSICAL THERAPIST

## 2025-04-08 PROCEDURE — 97140 MANUAL THERAPY 1/> REGIONS: CPT | Mod: GP | Performed by: PHYSICAL THERAPIST

## 2025-04-08 NOTE — PROGRESS NOTES
PHYSICAL THERAPY   TREATMENT NOTE    Patient Name:  Era Gray   Patient MRN: [id number]  Date: 2025    Time Calculation  Start Time: 1119  Stop Time: 1152  Time Calculation (min): 33 min    Insurance:  Insurance Type: [employer]  Visit number: 13  (includes 6 from last year)  [place]ved [place] of visits [place]  Authorization Needed: No    General   Reason for visit: L knee medial meniscus tear   Referred by: Dawson Singer    Therapy diagnoses:   Problem List Items Addressed This Visit             ICD-10-CM    Left medial knee pain - Primary M25.562     Other Visit Diagnoses         Codes    Other tear of medial meniscus, current injury, left knee, initial encounter     S83.242A            Assessment:  Pt continues to get benefit from needling each session with slow progress towards her goals and no adverse effects from needling.     Plan: Continue to update HEP as needed and manual prn.      Subjective  After needling last week she felt a spasm in her proximal gastroc head that's continued throughout the week. Tried massaging it without significant relief. Pain feels pretty constant. Knee has been okay this week but she is taking it easy on the turn out and her calf is bothering her more. Has been doing her core HEP a few times a week.   - Pain (0-10): 0/10     Precautions  PMH: seizures  Fall Risk: none    Objective      Treatment Performed:   Manual Therapy:   STM/DTM L adductor, quad, gastrocnemius  Medial joint release L knee    Dry Needlinmm x 3 each L proximal post tib, distal adductor, medial gastroc  30mm x 3 L medial quad tendon, adductor, medial joint line    Pt was educated on risk, benefits, and expectations of dry needling techniques and agrees to today's intervention using clean needle technique.          PT Therapeutic Procedures Time Entry  Manual Therapy Time Entry: 20  Needle Insertion w/o Injection 1 or 2: 13

## 2025-04-22 ENCOUNTER — TREATMENT (OUTPATIENT)
Dept: PHYSICAL THERAPY | Facility: CLINIC | Age: 45
End: 2025-04-22
Payer: COMMERCIAL

## 2025-04-22 DIAGNOSIS — M25.562 LEFT MEDIAL KNEE PAIN: Primary | ICD-10-CM

## 2025-04-22 DIAGNOSIS — S83.242A OTHER TEAR OF MEDIAL MENISCUS, CURRENT INJURY, LEFT KNEE, INITIAL ENCOUNTER: ICD-10-CM

## 2025-04-22 PROCEDURE — 20560 NDL INSJ W/O NJX 1 OR 2 MUSC: CPT | Mod: GP | Performed by: PHYSICAL THERAPIST

## 2025-04-22 PROCEDURE — 97140 MANUAL THERAPY 1/> REGIONS: CPT | Mod: GP | Performed by: PHYSICAL THERAPIST

## 2025-04-22 NOTE — PROGRESS NOTES
PHYSICAL THERAPY   TREATMENT NOTE    Patient Name:  Era Gray   Patient MRN: [id number]  Date: 2025    Time Calculation  Start Time: 1122  Stop Time: 1150  Time Calculation (min): 28 min    Insurance:  Insurance Type: [employer]  Visit number: 14  (includes 6 from last year)  [place]ved [place] of visits [place]  Authorization Needed: No    General   Reason for visit: L knee medial meniscus tear   Referred by: Dawson Singer    Therapy diagnoses:   Problem List Items Addressed This Visit           ICD-10-CM    Left medial knee pain - Primary M25.562     Other Visit Diagnoses         Codes      Other tear of medial meniscus, current injury, left knee, initial encounter     S83.242A              Assessment:  Pt continues to get benefit from needling each session with slow progress towards her goals and no adverse effects from needling.     Plan: Recheck and potentially discharge if continuing to do well.      Subjective  Calf has been feeling better, still a little tight. 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 HEP going well.   - Pain (0-10): 0/10 currently.     Precautions  PMH: seizures  Fall Risk: none    Objective      Treatment Performed:   Manual Therapy:   STM/DTM L adductor, quad, gastrocnemius  Medial joint release L knee    Dry Needlinmm x 7: L proximal post tib x 2, distal adductor x 2, medial gastroc x 2 distal medial hamstring x 1   30mm x 3 L medial quad tendon, adductor, medial joint line    Pt was educated on risk, benefits, and expectations of dry needling techniques and agrees to today's intervention using clean needle technique.          PT Therapeutic Procedures Time Entry  Manual Therapy Time Entry: 10  Needle Insertion w/o Injection 1 or 2: 18

## 2025-05-06 ENCOUNTER — APPOINTMENT (OUTPATIENT)
Dept: PHYSICAL THERAPY | Facility: CLINIC | Age: 45
End: 2025-05-06
Payer: COMMERCIAL

## 2025-05-06 ENCOUNTER — DOCUMENTATION (OUTPATIENT)
Dept: PHYSICAL THERAPY | Facility: CLINIC | Age: 45
End: 2025-05-06
Payer: COMMERCIAL

## 2025-05-06 NOTE — PROGRESS NOTES
Physical Therapy                 Therapy Communication Note    Patient Name: Era Gray  MRN: [id number]  Department:   Room: Room/bed info not found  Today's Date: 5/6/2025     Discipline: Physical Therapy      Missed Time: Cancel    Comment: cx via [email or web address]

## 2025-05-20 ENCOUNTER — OFFICE VISIT (OUTPATIENT)
Dept: ORTHOPEDIC SURGERY | Facility: CLINIC | Age: 45
End: 2025-05-20
Payer: COMMERCIAL

## 2025-05-20 ENCOUNTER — HOSPITAL ENCOUNTER (OUTPATIENT)
Dept: RADIOLOGY | Facility: CLINIC | Age: 45
Discharge: HOME | End: 2025-05-20
Payer: COMMERCIAL

## 2025-05-20 DIAGNOSIS — M54.16 LUMBAR RADICULITIS: ICD-10-CM

## 2025-05-20 DIAGNOSIS — M47.816 LUMBAR SPONDYLOSIS: ICD-10-CM

## 2025-05-20 DIAGNOSIS — M62.830 BACK MUSCLE SPASM: ICD-10-CM

## 2025-05-20 DIAGNOSIS — M47.816 LUMBAR SPONDYLOSIS: Primary | ICD-10-CM

## 2025-05-20 PROCEDURE — 1036F TOBACCO NON-USER: CPT | Performed by: PHYSICAL MEDICINE & REHABILITATION

## 2025-05-20 PROCEDURE — 72100 X-RAY EXAM L-S SPINE 2/3 VWS: CPT | Performed by: RADIOLOGY

## 2025-05-20 PROCEDURE — 72100 X-RAY EXAM L-S SPINE 2/3 VWS: CPT

## 2025-05-20 PROCEDURE — 99204 OFFICE O/P NEW MOD 45 MIN: CPT | Performed by: PHYSICAL MEDICINE & REHABILITATION

## 2025-05-20 RX ORDER — METHYLPREDNISOLONE 4 MG/1
TABLET ORAL
Qty: 21 TABLET | Refills: 0 | Status: SHIPPED | OUTPATIENT
Start: 2025-05-20

## 2025-05-20 RX ORDER — METHOCARBAMOL 500 MG/1
TABLET, FILM COATED ORAL
Qty: 60 TABLET | Refills: 1 | Status: SHIPPED | OUTPATIENT
Start: 2025-05-20

## 2025-05-20 RX ORDER — MELOXICAM 15 MG/1
15 TABLET ORAL DAILY PRN
Qty: 30 TABLET | Refills: 1 | Status: SHIPPED | OUTPATIENT
Start: 2025-05-20 | End: 2025-06-19

## 2025-05-20 SDOH — SOCIAL STABILITY: SOCIAL NETWORK: SOCIAL ACTIVITY:: 5
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
## 2025-06-11 ENCOUNTER — EVALUATION (OUTPATIENT)
Dept: PHYSICAL THERAPY | Facility: CLINIC | Age: 45
End: 2025-06-11
Payer: MEDICARE

## 2025-06-11 DIAGNOSIS — M54.16 LUMBAR RADICULITIS: ICD-10-CM

## 2025-06-11 DIAGNOSIS — M54.50 LOW BACK PAIN: ICD-10-CM

## 2025-06-11 DIAGNOSIS — M47.816 LUMBAR SPONDYLOSIS: Primary | ICD-10-CM

## 2025-06-11 DIAGNOSIS — M62.830 BACK MUSCLE SPASM: ICD-10-CM

## 2025-06-11 PROCEDURE — 97110 THERAPEUTIC EXERCISES: CPT | Mod: GP | Performed by: PHYSICAL THERAPIST

## 2025-06-11 PROCEDURE — 97161 PT EVAL LOW COMPLEX 20 MIN: CPT | Mod: GP | Performed by: PHYSICAL THERAPIST

## 2025-06-11 PROCEDURE — 20560 NDL INSJ W/O NJX 1 OR 2 MUSC: CPT | Mod: GP | Performed by: PHYSICAL THERAPIST

## 2025-06-11 ASSESSMENT — ENCOUNTER SYMPTOMS
DEPRESSION: 0
OCCASIONAL FEELINGS OF UNSTEADINESS: 0
LOSS OF SENSATION IN FEET: 0

## 2025-06-11 ASSESSMENT — PATIENT HEALTH QUESTIONNAIRE - PHQ9
2. FEELING DOWN, DEPRESSED OR HOPELESS: NOT AT ALL
SUM OF ALL RESPONSES TO PHQ9 QUESTIONS 1 AND 2: 0
1. LITTLE INTEREST OR PLEASURE IN DOING THINGS: NOT AT ALL

## 2025-06-11 ASSESSMENT — COLUMBIA-SUICIDE SEVERITY RATING SCALE - C-SSRS
2. HAVE YOU ACTUALLY HAD ANY THOUGHTS OF KILLING YOURSELF?: NO
1. IN THE PAST MONTH, HAVE YOU WISHED YOU WERE DEAD OR WISHED YOU COULD GO TO SLEEP AND NOT WAKE UP?: NO
6. HAVE YOU EVER DONE ANYTHING, STARTED TO DO ANYTHING, OR PREPARED TO DO ANYTHING TO END YOUR LIFE?: NO
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
## 2025-06-27 ENCOUNTER — TREATMENT (OUTPATIENT)
Dept: PHYSICAL THERAPY | Facility: CLINIC | Age: 45
End: 2025-06-27
Payer: MEDICARE

## 2025-06-27 DIAGNOSIS — M54.16 LUMBAR RADICULITIS: ICD-10-CM

## 2025-06-27 DIAGNOSIS — M62.830 BACK MUSCLE SPASM: ICD-10-CM

## 2025-06-27 DIAGNOSIS — M47.816 LUMBAR SPONDYLOSIS: ICD-10-CM

## 2025-06-27 PROCEDURE — 20561 NDL INSJ W/O NJX 3+ MUSC: CPT | Mod: GP | Performed by: PHYSICAL THERAPIST

## 2025-06-27 PROCEDURE — 97110 THERAPEUTIC EXERCISES: CPT | Mod: GP | Performed by: PHYSICAL THERAPIST
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
## 2025-07-07 ENCOUNTER — TREATMENT (OUTPATIENT)
Dept: PHYSICAL THERAPY | Facility: CLINIC | Age: 45
End: 2025-07-07
Payer: COMMERCIAL

## 2025-07-07 DIAGNOSIS — M47.816 LUMBAR SPONDYLOSIS: ICD-10-CM

## 2025-07-07 DIAGNOSIS — M62.830 BACK MUSCLE SPASM: ICD-10-CM

## 2025-07-07 DIAGNOSIS — M54.16 LUMBAR RADICULITIS: ICD-10-CM

## 2025-07-07 PROCEDURE — 97110 THERAPEUTIC EXERCISES: CPT | Mod: GP | Performed by: PHYSICAL THERAPIST

## 2025-07-07 PROCEDURE — 20561 NDL INSJ W/O NJX 3+ MUSC: CPT | Mod: GP | Performed by: PHYSICAL THERAPIST
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
## 2025-07-14 ENCOUNTER — TREATMENT (OUTPATIENT)
Dept: PHYSICAL THERAPY | Facility: CLINIC | Age: 45
End: 2025-07-14
Payer: COMMERCIAL

## 2025-07-14 ENCOUNTER — APPOINTMENT (OUTPATIENT)
Dept: ORTHOPEDIC SURGERY | Facility: CLINIC | Age: 45
End: 2025-07-14
Payer: COMMERCIAL

## 2025-07-14 DIAGNOSIS — M54.16 LUMBAR RADICULITIS: ICD-10-CM

## 2025-07-14 DIAGNOSIS — M62.830 BACK MUSCLE SPASM: ICD-10-CM

## 2025-07-14 DIAGNOSIS — M54.50 LOW BACK PAIN: Primary | ICD-10-CM

## 2025-07-14 DIAGNOSIS — M47.816 LUMBAR SPONDYLOSIS: Primary | ICD-10-CM

## 2025-07-14 DIAGNOSIS — M47.816 LUMBAR SPONDYLOSIS: ICD-10-CM

## 2025-07-14 PROCEDURE — 99213 OFFICE O/P EST LOW 20 MIN: CPT | Performed by: PHYSICAL MEDICINE & REHABILITATION

## 2025-07-14 PROCEDURE — 97112 NEUROMUSCULAR REEDUCATION: CPT | Mod: GP | Performed by: PHYSICAL THERAPIST

## 2025-07-14 PROCEDURE — 1036F TOBACCO NON-USER: CPT | Performed by: PHYSICAL MEDICINE & REHABILITATION

## 2025-07-14 PROCEDURE — 20561 NDL INSJ W/O NJX 3+ MUSC: CPT | Mod: GP | Performed by: PHYSICAL THERAPIST

## 2025-07-14 PROCEDURE — 97140 MANUAL THERAPY 1/> REGIONS: CPT | Mod: GP | Performed by: PHYSICAL THERAPIST

## 2025-07-14 RX ORDER — METHOCARBAMOL 500 MG/1
TABLET, FILM COATED ORAL
Qty: 60 TABLET | Refills: 1 | Status: SHIPPED | OUTPATIENT
Start: 2025-07-14

## 2025-07-14 SDOH — SOCIAL STABILITY: SOCIAL NETWORK: SOCIAL ACTIVITY:: 3
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
## 2025-07-15 ENCOUNTER — APPOINTMENT (OUTPATIENT)
Dept: ORTHOPEDIC SURGERY | Facility: CLINIC | Age: 45
End: 2025-07-15
Payer: COMMERCIAL

## 2025-07-20 DIAGNOSIS — M54.16 LUMBAR RADICULITIS: ICD-10-CM

## 2025-07-20 DIAGNOSIS — M47.816 LUMBAR SPONDYLOSIS: ICD-10-CM

## 2025-07-21 ENCOUNTER — TREATMENT (OUTPATIENT)
Dept: PHYSICAL THERAPY | Facility: CLINIC | Age: 45
End: 2025-07-21
Payer: COMMERCIAL

## 2025-07-21 DIAGNOSIS — M62.830 BACK MUSCLE SPASM: ICD-10-CM

## 2025-07-21 DIAGNOSIS — M47.816 LUMBAR SPONDYLOSIS: ICD-10-CM

## 2025-07-21 DIAGNOSIS — M54.50 LOW BACK PAIN: Primary | ICD-10-CM

## 2025-07-21 DIAGNOSIS — M54.16 LUMBAR RADICULITIS: ICD-10-CM

## 2025-07-21 PROCEDURE — 97110 THERAPEUTIC EXERCISES: CPT | Mod: GP | Performed by: PHYSICAL THERAPIST

## 2025-07-21 PROCEDURE — 97112 NEUROMUSCULAR REEDUCATION: CPT | Mod: GP | Performed by: PHYSICAL THERAPIST

## 2025-07-21 RX ORDER — MELOXICAM 15 MG/1
15 TABLET ORAL DAILY PRN
Qty: 30 TABLET | Refills: 1 | Status: SHIPPED | OUTPATIENT
Start: 2025-07-21 | End: 2025-08-20
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
